# Patient Record
(demographics unavailable — no encounter records)

---

## 2024-11-08 NOTE — PHYSICAL EXAM
[de-identified] : Gen: NAD Head: NC/AT Eyes: wears glasses, no scleral icterus ENT: mucous membranes moist CV: No JVD Lungs: nonlabored breathing Abd: soft, NT/ND Ext: full ROM in all extremities, no peripheral edema Back: +SLR on the RIGHT, +facet loading on the right, +TTP in the right low lumbar region  Neuro: CN intact LEs +5 L +5 R hip flexion +5 L +5 R leg extension +5 L +5 R leg flexion +5 L +5 R foot dorsiflexion +5 L +5 R foot plantarflexion +5 L +5 R EHL extension Psych: normal affect Skin: no visible lesions

## 2024-11-08 NOTE — DISCUSSION/SUMMARY
[de-identified] : PAUL NICE is a 72 year-old woman presenting for a NPV for a history of chronic low back pain.   Prior treatment: Physical therapy Chiropractic care Lumbar ALESSANDRO Patient has participated and failed at least 6 weeks of conservative therapy including physical therapy and a prescribed home exercise program as well as 6 weeks of activity modifications, including heat, ice, rest, and over the counter medications.   Plan: 1) MRI lumbar spine images reviewed with the patient. Image is from 2018, shows anterolisthesis at L4-L5 with central stenosis. 2) Obtain new MRI lumbar spine w/o contrast 3) Consider ALESSANDRO based on results of the above 4) Continue acetaminophen prn 5) RTC 1-2 weeks to review imaging

## 2024-11-08 NOTE — HISTORY OF PRESENT ILLNESS
[Lower back] : lower back [Buttock] : buttock [Right Leg] : right leg [10] : 10 [0] : 0 [Dull/Aching] : dull/aching [Radiating] : radiating [Shooting] : shooting [Stabbing] : stabbing [Throbbing] : throbbing [Tightness] : tightness [Tingling] : tingling [Constant] : constant [Household chores] : household chores [Leisure] : leisure [Sleep] : sleep [Meds] : meds [Injection therapy] : injection therapy [Standing] : standing [Walking] : walking [FreeTextEntry1] : 11/08/2024 PAUL NICE is a 72 year-old woman presenting for a NPV for a history of chronic low back pain. The patient has had pain in the low back for many years. She had previously been treated with PT and lumbar epidural injections but has not had treatment since 2020. The patient has had some treatment from a chiropractor recently. At this point, the patient describes an aching pain in the right low back with radiation to the right posterior thigh and leg.  The patient states that average pain over the past week was 6/10 in severity. Mood: Sleep: [] : no [FreeTextEntry6] : numbness and tingling in R  [FreeTextEntry7] : R leg  [FreeTextEntry9] : chiropractor care, [facility]  [de-identified] : getting up from sitting

## 2024-11-08 NOTE — HISTORY OF PRESENT ILLNESS
[Lower back] : lower back [Buttock] : buttock [Right Leg] : right leg [10] : 10 [0] : 0 [Dull/Aching] : dull/aching [Radiating] : radiating [Shooting] : shooting [Stabbing] : stabbing [Throbbing] : throbbing [Tightness] : tightness [Tingling] : tingling [Constant] : constant [Household chores] : household chores [Leisure] : leisure [Sleep] : sleep [Meds] : meds [Injection therapy] : injection therapy [Standing] : standing [Walking] : walking [FreeTextEntry1] : 11/08/2024 PAUL NICE is a 72 year-old woman presenting for a NPV for a history of chronic low back pain. The patient has had pain in the low back for many years. She had previously been treated with PT and lumbar epidural injections but has not had treatment since 2020. The patient has had some treatment from a chiropractor recently. At this point, the patient describes an aching pain in the right low back with radiation to the right posterior thigh and leg.  The patient states that average pain over the past week was 6/10 in severity. Mood: Sleep: [] : no [FreeTextEntry6] : numbness and tingling in R  [FreeTextEntry7] : R leg  [FreeTextEntry9] : chiropractor care, [facility]  [de-identified] : getting up from sitting

## 2024-11-08 NOTE — DISCUSSION/SUMMARY
[de-identified] : PAUL NICE is a 72 year-old woman presenting for a NPV for a history of chronic low back pain.   Prior treatment: Physical therapy Chiropractic care Lumbar ALESSANDRO Patient has participated and failed at least 6 weeks of conservative therapy including physical therapy and a prescribed home exercise program as well as 6 weeks of activity modifications, including heat, ice, rest, and over the counter medications.   Plan: 1) MRI lumbar spine images reviewed with the patient. Image is from 2018, shows anterolisthesis at L4-L5 with central stenosis. 2) Obtain new MRI lumbar spine w/o contrast 3) Consider ALESSANDRO based on results of the above 4) Continue acetaminophen prn 5) RTC 1-2 weeks to review imaging

## 2024-11-08 NOTE — PHYSICAL EXAM
[de-identified] : Gen: NAD Head: NC/AT Eyes: wears glasses, no scleral icterus ENT: mucous membranes moist CV: No JVD Lungs: nonlabored breathing Abd: soft, NT/ND Ext: full ROM in all extremities, no peripheral edema Back: +SLR on the RIGHT, +facet loading on the right, +TTP in the right low lumbar region  Neuro: CN intact LEs +5 L +5 R hip flexion +5 L +5 R leg extension +5 L +5 R leg flexion +5 L +5 R foot dorsiflexion +5 L +5 R foot plantarflexion +5 L +5 R EHL extension Psych: normal affect Skin: no visible lesions

## 2024-11-27 NOTE — HISTORY OF PRESENT ILLNESS
[Lower back] : lower back [Buttock] : buttock [Right Leg] : right leg [10] : 10 [0] : 0 [Dull/Aching] : dull/aching [Radiating] : radiating [Shooting] : shooting [Stabbing] : stabbing [Throbbing] : throbbing [Tightness] : tightness [Tingling] : tingling [Constant] : constant [Household chores] : household chores [Leisure] : leisure [Sleep] : sleep [Meds] : meds [Injection therapy] : injection therapy [Standing] : standing [Walking] : walking [FreeTextEntry1] : 11/27/2024: PAUL NICE is a 72 year-old woman presenting for a RPV for a history of chronic low back pain. The patient continues to note an aching pain in the right low lumbar region w/ radiation to the right posterior thigh and leg. No focal numbness or weakness in the lower extremities. No bowel or bladder incontinence or saddle anesthesia. Has been taking OTC NSAIDs. The patient states that average pain over the past week was 7/10 in severity. Mood: Patient has depression and anxiety. Takes buproprion. Sleep: No difficulty with sleep 2/2 pain.   11/8/2024 PAUL NICE is a 72 year-old woman presenting for a NPV for a history of chronic low back pain. The patient has had pain in the low back for many years. She had previously been treated with PT and lumbar epidural injections but has not had treatment since 2020. The patient has had some treatment from a chiropractor recently. At this point, the patient describes an aching pain in the right low back with radiation to the right posterior thigh and leg.  The patient states that average pain over the past week was 6/10 in severity. [] : no [FreeTextEntry6] : numbness and tingling in R  [FreeTextEntry7] : R leg  [FreeTextEntry9] : chiropractor care, [facility]  [de-identified] : getting up from sitting

## 2024-11-27 NOTE — DISCUSSION/SUMMARY
[de-identified] : PAUL NICE is a 72 year-old woman presenting for a RPV for a history of chronic low back pain.   Prior treatment: Physical therapy Chiropractic care Lumbar ALESSANDRO Patient has participated and failed at least 6 weeks of conservative therapy including physical therapy and a prescribed home exercise program as well as 6 weeks of activity modifications, including heat, ice, rest, and over the counter medications.   Plan: 1) MRI lumbar spine images reviewed with the patient. 2) Schedule L5-S1 ILESI w/o MAC. Patient taking ozempic, does not need clearance under local. The procedure was explained to the patient in detail. Reviewed risks, benefits, and alternatives with the patient. Some risks discussed included temporary increase in pain, bleeding, infection, and side effects from steroids. The patient expressed understanding and would like to proceed. 3) Continue acetaminophen prn 4) RTC post procedure

## 2024-11-27 NOTE — PHYSICAL EXAM
[de-identified] : Gen: NAD Head: NC/AT Eyes: wears glasses, no scleral icterus ENT: mucous membranes moist CV: No JVD Lungs: nonlabored breathing Abd: soft, NT/ND Ext: full ROM in all extremities, no peripheral edema Back: +SLR on the RIGHT, +facet loading on the right, +TTP in the right low lumbar region  Neuro: CN intact LEs +5 L +5 R hip flexion +5 L +5 R leg extension +5 L +5 R leg flexion +5 L +5 R foot dorsiflexion +5 L +5 R foot plantarflexion +5 L +5 R EHL extension Psych: normal affect Skin: no visible lesions
[de-identified] : Gen: NAD Head: NC/AT Eyes: wears glasses, no scleral icterus ENT: mucous membranes moist CV: No JVD Lungs: nonlabored breathing Abd: soft, NT/ND Ext: full ROM in all extremities, no peripheral edema Back: +SLR on the RIGHT, +facet loading on the right, +TTP in the right low lumbar region  Neuro: CN intact LEs +5 L +5 R hip flexion +5 L +5 R leg extension +5 L +5 R leg flexion +5 L +5 R foot dorsiflexion +5 L +5 R foot plantarflexion +5 L +5 R EHL extension Psych: normal affect Skin: no visible lesions
none

## 2024-11-27 NOTE — DISCUSSION/SUMMARY
[de-identified] : PAUL NICE is a 72 year-old woman presenting for a RPV for a history of chronic low back pain.   Prior treatment: Physical therapy Chiropractic care Lumbar ALESSANDRO Patient has participated and failed at least 6 weeks of conservative therapy including physical therapy and a prescribed home exercise program as well as 6 weeks of activity modifications, including heat, ice, rest, and over the counter medications.   Plan: 1) MRI lumbar spine images reviewed with the patient. 2) Schedule L5-S1 ILESI w/o MAC. Patient taking ozempic, does not need clearance under local. The procedure was explained to the patient in detail. Reviewed risks, benefits, and alternatives with the patient. Some risks discussed included temporary increase in pain, bleeding, infection, and side effects from steroids. The patient expressed understanding and would like to proceed. 3) Continue acetaminophen prn 4) RTC post procedure

## 2024-11-27 NOTE — HISTORY OF PRESENT ILLNESS
[Lower back] : lower back [Buttock] : buttock [Right Leg] : right leg [10] : 10 [0] : 0 [Dull/Aching] : dull/aching [Radiating] : radiating [Shooting] : shooting [Stabbing] : stabbing [Throbbing] : throbbing [Tightness] : tightness [Tingling] : tingling [Constant] : constant [Household chores] : household chores [Leisure] : leisure [Sleep] : sleep [Meds] : meds [Injection therapy] : injection therapy [Standing] : standing [Walking] : walking [FreeTextEntry1] : 11/27/2024: PAUL NICE is a 72 year-old woman presenting for a RPV for a history of chronic low back pain. The patient continues to note an aching pain in the right low lumbar region w/ radiation to the right posterior thigh and leg. No focal numbness or weakness in the lower extremities. No bowel or bladder incontinence or saddle anesthesia. Has been taking OTC NSAIDs. The patient states that average pain over the past week was 7/10 in severity. Mood: Patient has depression and anxiety. Takes buproprion. Sleep: No difficulty with sleep 2/2 pain.   11/8/2024 PAUL NICE is a 72 year-old woman presenting for a NPV for a history of chronic low back pain. The patient has had pain in the low back for many years. She had previously been treated with PT and lumbar epidural injections but has not had treatment since 2020. The patient has had some treatment from a chiropractor recently. At this point, the patient describes an aching pain in the right low back with radiation to the right posterior thigh and leg.  The patient states that average pain over the past week was 6/10 in severity. [] : no [FreeTextEntry6] : numbness and tingling in R  [FreeTextEntry7] : R leg  [FreeTextEntry9] : chiropractor care, [facility]  [de-identified] : getting up from sitting

## 2024-11-27 NOTE — DATA REVIEWED
[MRI] : MRI [Lumbar Spine] : lumbar spine [Report was reviewed and noted in the chart] : The report was reviewed and noted in the chart [I independently reviewed and interpreted images and report] : I independently reviewed and interpreted images and report [FreeTextEntry1] : 11/13/2024 MRI Lumbar Spine O&C Modic I endplate changes at L1-L2.  L1-L2: broad disc bulge, ligamentum flavum hypertrophy, broad based central disc herniation w/ caudal migration resulting in moderate central stenosis, worse than previous; moderate bilateral NF stenosis worse compared to prior L2-L3: broad disc bulge, mild bilateral facet hypertrophy, ligamentum flavum hypertrophy, moderate central stenosis, moderate right and mild to moderate left NF stenosis L3-L4: diffuse disc bulge, ligamentum thickening, disc osteophyte complex asymmetric to the left, moderate central stenosis most pronounced in left paracentral zone; moderate LEFT and mild to moderate RIGHT NF stenosis, slightly worse than prior; mild to moderate facet arthrosis L4-L5: anterolisthesis, moderate to severe bilateral facet arthrosis, diffuse disc bulge w/ protrusion, ligamentum thickening; moderate to severe central stenosis, moderate LEFT and mild to moderate right NF stenosis L5-S1: mild to moderate bilateral facet arthrosis, small disc bulge

## 2025-01-10 NOTE — PHYSICAL EXAM
[de-identified] : Gen: NAD Head: NC/AT Eyes: wears glasses, no scleral icterus ENT: mucous membranes moist CV: No JVD Lungs: nonlabored breathing Abd: soft, NT/ND Ext: full ROM in all extremities, no peripheral edema Back: +facet loading on the right, +TTP in the right low lumbar region  Neuro: CN intact LEs +5 L +5 R hip flexion +5 L +5 R leg extension +5 L +5 R leg flexion +5 L +5 R foot dorsiflexion +5 L +5 R foot plantarflexion +5 L +5 R EHL extension Psych: normal affect Skin: no visible lesions

## 2025-01-10 NOTE — DISCUSSION/SUMMARY
[de-identified] : PAUL NICE is a 72 year-old woman presenting for a RPV for a history of chronic low back pain.   Prior treatment: 12/27/2024 L5-S1 ILESI w/o MAC w/ 50% improvement of pain and function  Physical therapy Chiropractic care Lumbar ALESSANDRO Patient has participated and failed at least 6 weeks of conservative therapy including physical therapy and a prescribed home exercise program as well as 6 weeks of activity modifications, including heat, ice, rest, and over the counter medications.   Plan: 1) MRI lumbar spine images reviewed with the patient. 2) Schedule RIGHT L2, L3, L4, L5 MBB w/o MAC x2. The procedure was explained to the patient in detail. Reviewed risks, benefits, and alternatives with the patient. Some risks discussed included temporary increase in pain, bleeding, infection, and side effects from steroids. The patient expressed understanding and would like to proceed. Procedure will be performed as a diagnostic step in determining the etiology of pain. If patient has relief, will continue to proceed down the path towards radiofrequency ablation. 3) Continue acetaminophen prn 4) RTC post procedure

## 2025-01-10 NOTE — DISCUSSION/SUMMARY
[de-identified] : PAUL NICE is a 72 year-old woman presenting for a RPV for a history of chronic low back pain.   Prior treatment: 12/27/2024 L5-S1 ILESI w/o MAC w/ 50% improvement of pain and function  Physical therapy Chiropractic care Lumbar ALESSANDRO Patient has participated and failed at least 6 weeks of conservative therapy including physical therapy and a prescribed home exercise program as well as 6 weeks of activity modifications, including heat, ice, rest, and over the counter medications.   Plan: 1) MRI lumbar spine images reviewed with the patient. 2) Schedule RIGHT L2, L3, L4, L5 MBB w/o MAC x2. The procedure was explained to the patient in detail. Reviewed risks, benefits, and alternatives with the patient. Some risks discussed included temporary increase in pain, bleeding, infection, and side effects from steroids. The patient expressed understanding and would like to proceed. Procedure will be performed as a diagnostic step in determining the etiology of pain. If patient has relief, will continue to proceed down the path towards radiofrequency ablation. 3) Continue acetaminophen prn 4) RTC post procedure

## 2025-01-10 NOTE — HISTORY OF PRESENT ILLNESS
[Lower back] : lower back [Buttock] : buttock [Right Leg] : right leg [10] : 10 [0] : 0 [Dull/Aching] : dull/aching [Radiating] : radiating [Shooting] : shooting [Stabbing] : stabbing [Throbbing] : throbbing [Tightness] : tightness [Tingling] : tingling [Constant] : constant [Household chores] : household chores [Leisure] : leisure [Sleep] : sleep [Meds] : meds [Injection therapy] : injection therapy [Standing] : standing [Walking] : walking [7] : 7 [FreeTextEntry1] : 1/10/2025 PAUL NICE is a 72 year-old woman presenting for a RPV for a history of chronic low back pain. The patient underwent an L5-S1 ILESI w/o MAC on 12/27/2024. The patient notes having 50% improvement of pain and function since the procedure. Notes improvement of ADLs. Still notes some aching pain in the right low lumbar facet region, significant improvement of LE radicular symptoms.  The patient states that average pain over the past week was 5/10 in severity. Mood: Patient has depression and anxiety. Takes bupropion. Sleep: No difficulty with sleep 2/2 pain.   11/27/2024: PAUL NICE is a 72 year-old woman presenting for a RPV for a history of chronic low back pain. The patient continues to note an aching pain in the right low lumbar region w/ radiation to the right posterior thigh and leg. No focal numbness or weakness in the lower extremities. No bowel or bladder incontinence or saddle anesthesia. Has been taking OTC NSAIDs. The patient states that average pain over the past week was 7/10 in severity. Mood: Patient has depression and anxiety. Takes buproprion. Sleep: No difficulty with sleep 2/2 pain.   11/8/2024 PAUL NICE is a 72 year-old woman presenting for a NPV for a history of chronic low back pain. The patient has had pain in the low back for many years. She had previously been treated with PT and lumbar epidural injections but has not had treatment since 2020. The patient has had some treatment from a chiropractor recently. At this point, the patient describes an aching pain in the right low back with radiation to the right posterior thigh and leg.  The patient states that average pain over the past week was 6/10 in severity. [] : no [FreeTextEntry6] : numbness and tingling in R  [FreeTextEntry7] : R leg  [FreeTextEntry9] : chiropractor care, [facility]  [de-identified] : getting up from sitting

## 2025-01-10 NOTE — HISTORY OF PRESENT ILLNESS
[Lower back] : lower back [Buttock] : buttock [Right Leg] : right leg [10] : 10 [0] : 0 [Dull/Aching] : dull/aching [Radiating] : radiating [Shooting] : shooting [Stabbing] : stabbing [Throbbing] : throbbing [Tightness] : tightness [Tingling] : tingling [Constant] : constant [Household chores] : household chores [Leisure] : leisure [Sleep] : sleep [Meds] : meds [Injection therapy] : injection therapy [Standing] : standing [Walking] : walking [7] : 7 [FreeTextEntry1] : 1/10/2025 PAUL NICE is a 72 year-old woman presenting for a RPV for a history of chronic low back pain. The patient underwent an L5-S1 ILESI w/o MAC on 12/27/2024. The patient notes having 50% improvement of pain and function since the procedure. Notes improvement of ADLs. Still notes some aching pain in the right low lumbar facet region, significant improvement of LE radicular symptoms.  The patient states that average pain over the past week was 5/10 in severity. Mood: Patient has depression and anxiety. Takes bupropion. Sleep: No difficulty with sleep 2/2 pain.   11/27/2024: PAUL NICE is a 72 year-old woman presenting for a RPV for a history of chronic low back pain. The patient continues to note an aching pain in the right low lumbar region w/ radiation to the right posterior thigh and leg. No focal numbness or weakness in the lower extremities. No bowel or bladder incontinence or saddle anesthesia. Has been taking OTC NSAIDs. The patient states that average pain over the past week was 7/10 in severity. Mood: Patient has depression and anxiety. Takes buproprion. Sleep: No difficulty with sleep 2/2 pain.   11/8/2024 PAUL NICE is a 72 year-old woman presenting for a NPV for a history of chronic low back pain. The patient has had pain in the low back for many years. She had previously been treated with PT and lumbar epidural injections but has not had treatment since 2020. The patient has had some treatment from a chiropractor recently. At this point, the patient describes an aching pain in the right low back with radiation to the right posterior thigh and leg.  The patient states that average pain over the past week was 6/10 in severity. [] : no [FreeTextEntry6] : numbness and tingling in R  [FreeTextEntry7] : R leg  [FreeTextEntry9] : chiropractor care, [facility]  [de-identified] : getting up from sitting

## 2025-01-10 NOTE — PHYSICAL EXAM
[de-identified] : Gen: NAD Head: NC/AT Eyes: wears glasses, no scleral icterus ENT: mucous membranes moist CV: No JVD Lungs: nonlabored breathing Abd: soft, NT/ND Ext: full ROM in all extremities, no peripheral edema Back: +facet loading on the right, +TTP in the right low lumbar region  Neuro: CN intact LEs +5 L +5 R hip flexion +5 L +5 R leg extension +5 L +5 R leg flexion +5 L +5 R foot dorsiflexion +5 L +5 R foot plantarflexion +5 L +5 R EHL extension Psych: normal affect Skin: no visible lesions

## 2025-02-07 NOTE — PROCEDURE
[FreeTextEntry1] : RIGHT L3, L4, L5 medial branch block [FreeTextEntry2] : lumbar facet arthropathy [FreeTextEntry3] : Procedure Date: 02/07/2025   Preoperative Diagnosis: lumbar facet arthropathy   Procedure: RIGHT L3, L4 medial branch and L5 dorsal ramus diagnostic block under fluoroscopic guidance   Anesthesia: local   Complications: none   EBL: none   Procedure in detail: Patient was seen and examined. Risks, benefits, and alternatives for the procedure were discussed with the patient in detail. The patient expressed understanding, gave written and verbal consent, and placed themselves in a prone position on the procedure table. Skin overlying the lumbosacral spine was prepped with chloraprep and draped in the usual sterile fashion. Fluoroscopic images were obtained to identify the L4 and L5 vertebral bodies. Target was the junction of the superior articular process and the transverse process on the RIGHT L4 and L5 vertebral body as well as at the sacral ala. Skin overlying the target was marked and infiltrated with 1% lidocaine. Using three 25 gauge, 3.5 inch spinal needles, these were inserted and advanced under intermittent fluoroscopic guidance. When felt to be make contact with os, lateral view was used to confirm depth. After negative aspiration for heme/csf, contrast was injected which showed contrast spread consistent with medial branch flow. No evidence of intravascular or intrathecal uptake. At this point, a total of 0.5ml of 0.5% bupivacaine was injected via each needle. The needles were restyletted and withdrawn, band aids were placed over the injection sites. Patient tolerated the procedure well. The patient recovered uneventfully and was discharged home in stable condition.

## 2025-02-19 NOTE — PROCEDURE
[FreeTextEntry1] : RIGHT L3, L4, L5 medial branch block [FreeTextEntry2] : lumbar facet arthropathy [FreeTextEntry3] : Procedure Date: 02/19/2025   Preoperative Diagnosis: lumbar facet arthropathy   Procedure: RIGHT L3, L4 medial branch and L5 dorsal ramus diagnostic block under fluoroscopic guidance   Anesthesia: local   Complications: none   EBL: none   Procedure in detail: Patient was seen and examined. Risks, benefits, and alternatives for the procedure were discussed with the patient in detail. The patient expressed understanding, gave written and verbal consent, and placed themselves in a prone position on the procedure table. Skin overlying the lumbosacral spine was prepped with chloraprep and draped in the usual sterile fashion. Fluoroscopic images were obtained to identify the L4 and L5 vertebral bodies. Target was the junction of the superior articular process and the transverse process on the RIGHT L4 and L5 vertebral body as well as at the sacral ala. Skin overlying the target was marked and infiltrated with 1% lidocaine. Using three 25 gauge, 3.5 inch spinal needles, these were inserted and advanced under intermittent fluoroscopic guidance. When felt to be make contact with os, lateral view was used to confirm depth. After negative aspiration for heme/csf, contrast was injected which showed contrast spread consistent with medial branch flow. No evidence of intravascular or intrathecal uptake. At this point, a total of 0.5ml of 0.5% bupivacaine and 0.3ml of 80mg/ml depomedrol was injected via each needle. The needles were restyletted and withdrawn, band aids were placed over the injection sites. Patient tolerated the procedure well. The patient recovered uneventfully and was discharged home in stable condition.

## 2025-03-07 NOTE — HISTORY OF PRESENT ILLNESS
[Lower back] : lower back [Buttock] : buttock [Right Leg] : right leg [7] : 7 [0] : 0 [Dull/Aching] : dull/aching [Radiating] : radiating [Shooting] : shooting [Stabbing] : stabbing [Throbbing] : throbbing [Tightness] : tightness [Tingling] : tingling [Constant] : constant [Household chores] : household chores [Leisure] : leisure [Sleep] : sleep [Meds] : meds [Injection therapy] : injection therapy [Standing] : standing [Walking] : walking [6] : 6 [1] : 2 [FreeTextEntry1] : 3/7/2025 PAUL NICE is a 72 year-old woman presenting for a RPV for a history of chronic low back pain. On 2/7/2025 and 2/19/2025, the patient underwent a RIGHTL3, L4, L5 MBB w/o MAC x2. The patient notes having 80% improvement of pain and function since the procedure. Notes improvement of ADLs. 7-10 days after each MBB, the patient notes recurrence of pain. Notes right-sided low back pain.  The patient states that average pain over the past week was 3/10 in severity. Mood: Patient has depression and anxiety. Takes bupropion. Sleep: No difficulty with sleep 2/2 pain.   1/10/2025 PAUL NICE is a 72 year-old woman presenting for a RPV for a history of chronic low back pain. The patient underwent an L5-S1 ILESI w/o MAC on 12/27/2024. The patient notes having 50% improvement of pain and function since the procedure. Notes improvement of ADLs. Still notes some aching pain in the right low lumbar facet region, significant improvement of LE radicular symptoms.  The patient states that average pain over the past week was 5/10 in severity. Mood: Patient has depression and anxiety. Takes bupropion. Sleep: No difficulty with sleep 2/2 pain.   11/27/2024: PAUL NICE is a 72 year-old woman presenting for a RPV for a history of chronic low back pain. The patient continues to note an aching pain in the right low lumbar region w/ radiation to the right posterior thigh and leg. No focal numbness or weakness in the lower extremities. No bowel or bladder incontinence or saddle anesthesia. Has been taking OTC NSAIDs. The patient states that average pain over the past week was 7/10 in severity. Mood: Patient has depression and anxiety. Takes buproprion. Sleep: No difficulty with sleep 2/2 pain.   11/8/2024 PAUL NICE is a 72 year-old woman presenting for a NPV for a history of chronic low back pain. The patient has had pain in the low back for many years. She had previously been treated with PT and lumbar epidural injections but has not had treatment since 2020. The patient has had some treatment from a chiropractor recently. At this point, the patient describes an aching pain in the right low back with radiation to the right posterior thigh and leg.  The patient states that average pain over the past week was 6/10 in severity. [] : no [FreeTextEntry6] : numbness and tingling in R  [FreeTextEntry7] : R leg  [FreeTextEntry9] : chiropractor care, [facility]  [de-identified] : getting up from sitting  [TWNoteComboBox1] : 70%

## 2025-03-07 NOTE — PHYSICAL EXAM
[de-identified] : Gen: NAD Head: NC/AT Eyes: wears glasses, no scleral icterus ENT: mucous membranes moist CV: No JVD Lungs: nonlabored breathing Abd: soft, NT/ND Ext: full ROM in all extremities, no peripheral edema Back: +facet loading on the right, +TTP in the right low lumbar region  Neuro: CN intact LEs +5 L +5 R hip flexion +5 L +5 R leg extension +5 L +5 R leg flexion +5 L +5 R foot dorsiflexion +5 L +5 R foot plantarflexion +5 L +5 R EHL extension Psych: normal affect Skin: no visible lesions

## 2025-03-07 NOTE — DISCUSSION/SUMMARY
[de-identified] : PAUL NICE is a 72 year-old woman presenting for a RPV for a history of chronic low back pain.   Prior treatment: 02/19/2025 RIGHT L2, L3, L4, L5 MBB #2 w/o MAC w/ 80% improvement of pain and function  02/07/2025 RIGHT L2, L3, L4, L5 MBB #1 w/o MAC w/ 80% improvement of pain and function  12/27/2024 L5-S1 ILESI w/o MAC w/ 50% improvement of pain and function  Physical therapy Chiropractic care Lumbar ALESSANDRO Patient has participated and failed at least 6 weeks of conservative therapy including physical therapy and a prescribed home exercise program as well as 6 weeks of activity modifications, including heat, ice, rest, and over the counter medications.   Plan: 1) MRI lumbar spine images reviewed with the patient. 2) Schedule RIGHT L3, L4, L5 MB RFA w/o MAC. The procedure was explained to the patient in detail. Reviewed risks, benefits, and alternatives with the patient. Some risks discussed included temporary increase in pain, bleeding, infection, and side effects from steroids. The patient expressed understanding and would like to proceed. 3) Continue acetaminophen prn 4) RTC post procedure

## 2025-03-07 NOTE — DISCUSSION/SUMMARY
[de-identified] : PAUL NICE is a 72 year-old woman presenting for a RPV for a history of chronic low back pain.   Prior treatment: 02/19/2025 RIGHT L2, L3, L4, L5 MBB #2 w/o MAC w/ 80% improvement of pain and function  02/07/2025 RIGHT L2, L3, L4, L5 MBB #1 w/o MAC w/ 80% improvement of pain and function  12/27/2024 L5-S1 ILESI w/o MAC w/ 50% improvement of pain and function  Physical therapy Chiropractic care Lumbar ALESSANDRO Patient has participated and failed at least 6 weeks of conservative therapy including physical therapy and a prescribed home exercise program as well as 6 weeks of activity modifications, including heat, ice, rest, and over the counter medications.   Plan: 1) MRI lumbar spine images reviewed with the patient. 2) Schedule RIGHT L3, L4, L5 MB RFA w/o MAC. The procedure was explained to the patient in detail. Reviewed risks, benefits, and alternatives with the patient. Some risks discussed included temporary increase in pain, bleeding, infection, and side effects from steroids. The patient expressed understanding and would like to proceed. 3) Continue acetaminophen prn 4) RTC post procedure

## 2025-03-07 NOTE — HISTORY OF PRESENT ILLNESS
[Lower back] : lower back [Buttock] : buttock [Right Leg] : right leg [7] : 7 [0] : 0 [Dull/Aching] : dull/aching [Radiating] : radiating [Shooting] : shooting [Stabbing] : stabbing [Throbbing] : throbbing [Tightness] : tightness [Tingling] : tingling [Constant] : constant [Household chores] : household chores [Leisure] : leisure [Sleep] : sleep [Meds] : meds [Injection therapy] : injection therapy [Standing] : standing [Walking] : walking [6] : 6 [1] : 2 [FreeTextEntry1] : 3/7/2025 PAUL NICE is a 72 year-old woman presenting for a RPV for a history of chronic low back pain. On 2/7/2025 and 2/19/2025, the patient underwent a RIGHTL3, L4, L5 MBB w/o MAC x2. The patient notes having 80% improvement of pain and function since the procedure. Notes improvement of ADLs. 7-10 days after each MBB, the patient notes recurrence of pain. Notes right-sided low back pain.  The patient states that average pain over the past week was 3/10 in severity. Mood: Patient has depression and anxiety. Takes bupropion. Sleep: No difficulty with sleep 2/2 pain.   1/10/2025 PAUL NICE is a 72 year-old woman presenting for a RPV for a history of chronic low back pain. The patient underwent an L5-S1 ILESI w/o MAC on 12/27/2024. The patient notes having 50% improvement of pain and function since the procedure. Notes improvement of ADLs. Still notes some aching pain in the right low lumbar facet region, significant improvement of LE radicular symptoms.  The patient states that average pain over the past week was 5/10 in severity. Mood: Patient has depression and anxiety. Takes bupropion. Sleep: No difficulty with sleep 2/2 pain.   11/27/2024: PAUL INCE is a 72 year-old woman presenting for a RPV for a history of chronic low back pain. The patient continues to note an aching pain in the right low lumbar region w/ radiation to the right posterior thigh and leg. No focal numbness or weakness in the lower extremities. No bowel or bladder incontinence or saddle anesthesia. Has been taking OTC NSAIDs. The patient states that average pain over the past week was 7/10 in severity. Mood: Patient has depression and anxiety. Takes buproprion. Sleep: No difficulty with sleep 2/2 pain.   11/8/2024 PAUL NICE is a 72 year-old woman presenting for a NPV for a history of chronic low back pain. The patient has had pain in the low back for many years. She had previously been treated with PT and lumbar epidural injections but has not had treatment since 2020. The patient has had some treatment from a chiropractor recently. At this point, the patient describes an aching pain in the right low back with radiation to the right posterior thigh and leg.  The patient states that average pain over the past week was 6/10 in severity. [] : no [FreeTextEntry6] : numbness and tingling in R  [FreeTextEntry7] : R leg  [FreeTextEntry9] : chiropractor care, [facility]  [de-identified] : getting up from sitting  [TWNoteComboBox1] : 70%

## 2025-03-07 NOTE — PHYSICAL EXAM
[de-identified] : Gen: NAD Head: NC/AT Eyes: wears glasses, no scleral icterus ENT: mucous membranes moist CV: No JVD Lungs: nonlabored breathing Abd: soft, NT/ND Ext: full ROM in all extremities, no peripheral edema Back: +facet loading on the right, +TTP in the right low lumbar region  Neuro: CN intact LEs +5 L +5 R hip flexion +5 L +5 R leg extension +5 L +5 R leg flexion +5 L +5 R foot dorsiflexion +5 L +5 R foot plantarflexion +5 L +5 R EHL extension Psych: normal affect Skin: no visible lesions

## 2025-03-20 NOTE — PROCEDURE
[FreeTextEntry1] : RIGHT L3, L4, L5 medial branch radiofrequency ablation [FreeTextEntry2] : lumbar facet arthropathy [FreeTextEntry3] : Procedure Date: 03/20/2025   Preoperative Diagnosis: RIGHT lumbar facet arthropathy   Procedure: RIGHT L3, L4 medial branch and L5 dorsal ramus radiofrequency ablation under fluoroscopic guidance   Anesthesia: local   Complications: none   EBL: none   Procedure in detail: Patient was seen and examined. Risks, benefits, and alternatives for the procedure were discussed with the patient in detail. The patient expressed understanding, gave written and verbal consent, and placed themselves in a prone position on the procedure table. Skin overlying the lumbosacral spine was prepped with chloraprep and draped in the usual sterile fashion. Fluoroscopic images were obtained to identify the L4 and L5 vertebral bodies. Targets were the junction of the superior articular process and the transverse process on the RIGHT L4 and L5 vertebral body as well as at the sacral ala. Skin overlying the targets was marked and infiltrated with 1% lidocaine. Using three 18 gauge, 100mm curved radiofrequency introducers, each was inserted and advanced under intermittent fluoroscopic guidance. When felt to be make contact with os, decline/pillar view and lateral view was used to confirm depth. At this point, we proceeded with sensory and motor testing. Sensory (50 Hz) and motor (2Hz)  testing was performed up to 2 V, which was positive for provoking local stimulation in the low back but negative for provoking any radicular symptoms. 1ml of 1% lidocaine was then injected through each introducer. Probes were placed through each introducer, and radiofrequency ablation was performed for 90 seconds at 80 degrees celsius. At the conclusion of the procedure, the patient had a total of 3ml of injectate, which consisted of 2ml of 1% lidocaine and 1ml of 80mg/ml depomedrol, injected through the three introducers in total. The introducers were then restyletted and withdrawn. No complications. Patient tolerated the procedure well. The patient recovered uneventfully and was discharged home in stable condition.

## 2025-04-03 NOTE — PHYSICAL EXAM
[de-identified] : Gen: NAD Head: NC/AT Eyes: wears glasses, no scleral icterus ENT: mucous membranes moist CV: No JVD Lungs: nonlabored breathing Abd: soft, NT/ND Ext: full ROM in all extremities, no peripheral edema Back: +facet loading on the right, +TTP in the right low lumbar region  Neuro: CN intact LEs +5 L +5 R hip flexion +5 L +5 R leg extension +5 L +5 R leg flexion +5 L +5 R foot dorsiflexion +5 L +5 R foot plantarflexion +5 L +5 R EHL extension Psych: normal affect Skin: no visible lesions

## 2025-04-03 NOTE — DISCUSSION/SUMMARY
[de-identified] : PAUL NICE is a 72 year-old woman presenting for a RPV for a history of chronic low back pain.   Prior treatment: 03/20/2025 RIGHT L3, L4, L5 MB RFA w/o MAC 02/19/2025 RIGHT L2, L3, L4, L5 MBB #2 w/o MAC w/ 80% improvement of pain and function  02/07/2025 RIGHT L2, L3, L4, L5 MBB #1 w/o MAC w/ 80% improvement of pain and function  12/27/2024 L5-S1 ILESI w/o MAC w/ 50% improvement of pain and function  Physical therapy Chiropractic care Lumbar [facility] Patient has participated and failed at least 6 weeks of conservative therapy including physical therapy and a prescribed home exercise program as well as 6 weeks of activity modifications, including heat, ice, rest, and over the counter medications.   Plan: 1) MRI lumbar spine images reviewed with the patient. 2) Schedule RIGHT L3, L4, L5 MB RFA w/o MAC. The procedure was explained to the patient in detail. Reviewed risks, benefits, and alternatives with the patient. Some risks discussed included temporary increase in pain, bleeding, infection, and side effects from steroids. The patient expressed understanding and would like to proceed. 3) Continue acetaminophen prn 4) RTC post procedure

## 2025-04-03 NOTE — HISTORY OF PRESENT ILLNESS
[FreeTextEntry1] : 04/04/2025 PAUL NICE is a 72 year-old woman presenting for a RPV for a history of chronic low back pain. 03/20/2025 RIGHT L3, L4, L5 MB RFA w/o MAC    The patient states that average pain over the past week was /10 in severity.   Mood: Sleep:  3/7/2025 PAUL NICE is a 72 year-old woman presenting for a RPV for a history of chronic low back pain. On 2/7/2025 and 2/19/2025, the patient underwent a RIGHTL3, L4, L5 MBB w/o MAC x2. The patient notes having 80% improvement of pain and function since the procedure. Notes improvement of ADLs. 7-10 days after each MBB, the patient notes recurrence of pain. Notes right-sided low back pain.  The patient states that average pain over the past week was 3/10 in severity. Mood: Patient has depression and anxiety. Takes bupropion. Sleep: No difficulty with sleep 2/2 pain.   1/10/2025 PAUL NICE is a 72 year-old woman presenting for a RPV for a history of chronic low back pain. The patient underwent an L5-S1 ILESI w/o MAC on 12/27/2024. The patient notes having 50% improvement of pain and function since the procedure. Notes improvement of ADLs. Still notes some aching pain in the right low lumbar facet region, significant improvement of LE radicular symptoms.  The patient states that average pain over the past week was 5/10 in severity. Mood: Patient has depression and anxiety. Takes bupropion. Sleep: No difficulty with sleep 2/2 pain.   11/27/2024: PAUL NICE is a 72 year-old woman presenting for a RPV for a history of chronic low back pain. The patient continues to note an aching pain in the right low lumbar region w/ radiation to the right posterior thigh and leg. No focal numbness or weakness in the lower extremities. No bowel or bladder incontinence or saddle anesthesia. Has been taking OTC NSAIDs. The patient states that average pain over the past week was 7/10 in severity. Mood: Patient has depression and anxiety. Takes buproprion. Sleep: No difficulty with sleep 2/2 pain.   11/8/2024 PAUL NICE is a 72 year-old woman presenting for a NPV for a history of chronic low back pain. The patient has had pain in the low back for many years. She had previously been treated with PT and lumbar epidural injections but has not had treatment since 2020. The patient has had some treatment from a chiropractor recently. At this point, the patient describes an aching pain in the right low back with radiation to the right posterior thigh and leg.  The patient states that average pain over the past week was 6/10 in severity. [Lower back] : lower back [Buttock] : buttock [Right Leg] : right leg [6] : 6 [1] : 2 [Dull/Aching] : dull/aching [Radiating] : radiating [Shooting] : shooting [Stabbing] : stabbing [Throbbing] : throbbing [Tightness] : tightness [Tingling] : tingling [Constant] : constant [Household chores] : household chores [Leisure] : leisure [Sleep] : sleep [Meds] : meds [Injection therapy] : injection therapy [Standing] : standing [Walking] : walking [] : no [FreeTextEntry6] : numbness and tingling in R  [FreeTextEntry7] : R leg  [FreeTextEntry9] : chiropractor care, [facility]  [de-identified] : getting up from sitting  [TWNoteComboBox1] : 70%

## 2025-04-03 NOTE — DISCUSSION/SUMMARY
[de-identified] : PAUL NICE is a 72 year-old woman presenting for a RPV for a history of chronic low back pain.   Prior treatment: 03/20/2025 RIGHT L3, L4, L5 MB RFA w/o MAC 02/19/2025 RIGHT L2, L3, L4, L5 MBB #2 w/o MAC w/ 80% improvement of pain and function  02/07/2025 RIGHT L2, L3, L4, L5 MBB #1 w/o MAC w/ 80% improvement of pain and function  12/27/2024 L5-S1 ILESI w/o MAC w/ 50% improvement of pain and function  Physical therapy Chiropractic care Lumbar [facility] Patient has participated and failed at least 6 weeks of conservative therapy including physical therapy and a prescribed home exercise program as well as 6 weeks of activity modifications, including heat, ice, rest, and over the counter medications.   Plan: 1) MRI lumbar spine images reviewed with the patient. 2) Schedule RIGHT L3, L4, L5 MB RFA w/o MAC. The procedure was explained to the patient in detail. Reviewed risks, benefits, and alternatives with the patient. Some risks discussed included temporary increase in pain, bleeding, infection, and side effects from steroids. The patient expressed understanding and would like to proceed. 3) Continue acetaminophen prn 4) RTC post procedure

## 2025-04-03 NOTE — HISTORY OF PRESENT ILLNESS
[FreeTextEntry1] : 04/04/2025 PAUL NICE is a 72 year-old woman presenting for a RPV for a history of chronic low back pain. 03/20/2025 RIGHT L3, L4, L5 MB RFA w/o MAC    The patient states that average pain over the past week was /10 in severity.   Mood: Sleep:  3/7/2025 PAUL NICE is a 72 year-old woman presenting for a RPV for a history of chronic low back pain. On 2/7/2025 and 2/19/2025, the patient underwent a RIGHTL3, L4, L5 MBB w/o MAC x2. The patient notes having 80% improvement of pain and function since the procedure. Notes improvement of ADLs. 7-10 days after each MBB, the patient notes recurrence of pain. Notes right-sided low back pain.  The patient states that average pain over the past week was 3/10 in severity. Mood: Patient has depression and anxiety. Takes bupropion. Sleep: No difficulty with sleep 2/2 pain.   1/10/2025 PAUL NICE is a 72 year-old woman presenting for a RPV for a history of chronic low back pain. The patient underwent an L5-S1 ILESI w/o MAC on 12/27/2024. The patient notes having 50% improvement of pain and function since the procedure. Notes improvement of ADLs. Still notes some aching pain in the right low lumbar facet region, significant improvement of LE radicular symptoms.  The patient states that average pain over the past week was 5/10 in severity. Mood: Patient has depression and anxiety. Takes bupropion. Sleep: No difficulty with sleep 2/2 pain.   11/27/2024: PAUL NICE is a 72 year-old woman presenting for a RPV for a history of chronic low back pain. The patient continues to note an aching pain in the right low lumbar region w/ radiation to the right posterior thigh and leg. No focal numbness or weakness in the lower extremities. No bowel or bladder incontinence or saddle anesthesia. Has been taking OTC NSAIDs. The patient states that average pain over the past week was 7/10 in severity. Mood: Patient has depression and anxiety. Takes buproprion. Sleep: No difficulty with sleep 2/2 pain.   11/8/2024 PAUL NICE is a 72 year-old woman presenting for a NPV for a history of chronic low back pain. The patient has had pain in the low back for many years. She had previously been treated with PT and lumbar epidural injections but has not had treatment since 2020. The patient has had some treatment from a chiropractor recently. At this point, the patient describes an aching pain in the right low back with radiation to the right posterior thigh and leg.  The patient states that average pain over the past week was 6/10 in severity. [Lower back] : lower back [Buttock] : buttock [Right Leg] : right leg [6] : 6 [1] : 2 [Dull/Aching] : dull/aching [Radiating] : radiating [Shooting] : shooting [Stabbing] : stabbing [Throbbing] : throbbing [Tightness] : tightness [Tingling] : tingling [Constant] : constant [Household chores] : household chores [Leisure] : leisure [Sleep] : sleep [Meds] : meds [Injection therapy] : injection therapy [Standing] : standing [Walking] : walking [] : no [FreeTextEntry6] : numbness and tingling in R  [FreeTextEntry7] : R leg  [FreeTextEntry9] : chiropractor care, [facility]  [de-identified] : getting up from sitting  [TWNoteComboBox1] : 70%

## 2025-04-03 NOTE — PHYSICAL EXAM
[de-identified] : Gen: NAD Head: NC/AT Eyes: wears glasses, no scleral icterus ENT: mucous membranes moist CV: No JVD Lungs: nonlabored breathing Abd: soft, NT/ND Ext: full ROM in all extremities, no peripheral edema Back: +facet loading on the right, +TTP in the right low lumbar region  Neuro: CN intact LEs +5 L +5 R hip flexion +5 L +5 R leg extension +5 L +5 R leg flexion +5 L +5 R foot dorsiflexion +5 L +5 R foot plantarflexion +5 L +5 R EHL extension Psych: normal affect Skin: no visible lesions